# Patient Record
Sex: FEMALE | Race: WHITE
[De-identification: names, ages, dates, MRNs, and addresses within clinical notes are randomized per-mention and may not be internally consistent; named-entity substitution may affect disease eponyms.]

---

## 2018-02-16 ENCOUNTER — HOSPITAL ENCOUNTER (OUTPATIENT)
Dept: HOSPITAL 68 - STS | Age: 48
End: 2018-02-16
Attending: UROLOGY
Payer: COMMERCIAL

## 2018-02-16 VITALS — WEIGHT: 182 LBS | HEIGHT: 64 IN | BODY MASS INDEX: 31.07 KG/M2

## 2018-02-16 DIAGNOSIS — N39.41: ICD-10-CM

## 2018-02-16 DIAGNOSIS — N81.10: ICD-10-CM

## 2018-02-16 DIAGNOSIS — N32.81: ICD-10-CM

## 2018-02-16 DIAGNOSIS — R10.2: ICD-10-CM

## 2018-02-16 DIAGNOSIS — Z85.43: ICD-10-CM

## 2018-02-16 DIAGNOSIS — N39.3: Primary | ICD-10-CM

## 2018-02-16 DIAGNOSIS — Z87.442: ICD-10-CM

## 2018-02-16 PROCEDURE — C9399 UNCLASSIFIED DRUGS OR BIOLOG: HCPCS

## 2018-02-16 PROCEDURE — C1771 REP DEV, URINARY, W/SLING: HCPCS

## 2018-02-16 NOTE — OPERATIVE REPORT
Operative/Inv Procedure Report
Surgery Date: 02/16/18
Name of Procedure:
cystocele repair with mesh, urethral sling, cystoscopy
Pre-Operative Diagnosis:
cystocele grade 3 and stress incontinence
Post-Operative Diagnosis:
same
Estimated Blood Loss: scant (150cc)
Surgeon/Assistant:
Lyssa Rubio MD
 
Anesthesia: laryngeal mask airway
Implants:
vaginal mesh
Complications:
none
Condition:
stable
Operative Indication:
cystocele grade 3 and stress incontinence
 
Operative/Procedure Note
Note:
Operative dictation on patient Sydney davila.  She has a history of symptomatic 
vaginal bulge and stress urinary incontinence and she wished to proceed with 
cystocele repair with mesh and urethral sling.  The risks of vaginal mesh was 
discussed as well as the risks benefits and alternatives of the surgery in 
general.  All questions were answered.  This was done in the office setting as 
well as the holding area.
 
Patient was taken to the operating room placed on the operating table in the 
supine position.  Timeout was performed.  IV antibiotics were infused.  Patient 
was placed in the dorsolithotomy position and she was prepped and draped in the 
standard sterile fashion after shaving the genitalia region.  Nelson catheter was
placed in the bladder was drained.  The Nelosn was clamped and placed on the 
patient's abdomen.  [place] retractor was placed to allow for excellent 
visualization.  The cystocele was addressed first.  1% lidocaine was infiltrated
into the anterior vaginal wall from the bladder neck to the cervix.  The vaginal
laps were created after this incised.  Care was taken not to injure the bladder.
 Once the bladder was completely dissected free and the dissection was carried 
down to the retropubic space to the sacrospinous process on both sides.  The 
ligament was cleaned off and the Capio thin device was used to place the Prolene
sutures into the ligaments on the right and left side.  The bladder neck sutures
were then placed on the right and left side.  The Restoril anterior mesh was 
then placed using these 4 sutures.  Methylene blue 10 mL were administered at 
this time by anesthesia to check for the patency of the ureters later in this 
case.  The mesh was secured at the bladder neck and at the proximal portion near
the cervix with 2-0 vicryl suture.  The mesh was seen to nicely reduce the 
cystocele.  There area was copiously irrigated with bacitracin irrigation.  
Surgicel was placed into the retropubic space on the right and left side.  
Cystoscopy was then performed and the bladder was globally inspected.  There was
no mesh in the bladder or the urethra.  The ureteral orifices were easily 
identified.  Blue efflux was seen emanating with excellent flow on both sides.  
Nelson was placed back into the bladder.  The incision site was closed with 2-0 
Vicryl running suture with interrupted interlocking sutures every third.  
 
Attention was then turned to the sling portion of the case.  1% lidocaine was 
infiltrated beneath the urethra.  Vaginal flaps are created taking care not to 
injure the urethra.  The Altis Sling kit was then opened and used to place the 
sling with the trochars provided.  The sling was seen to be in a nice tension-
free manner in a horizontal lie beneath the urethra.  There was no mesh in the 
vaginal fornices.  The tightening Prolene stitch was cut.  Incision was closed 
with running locking 3-0 Vicryl suture.  Cystoscopy again was performed and 
there was no mesh in the bladder wall or the urethra.  Sponge and needle count 
were correct at the end of the case.  Patient tolerated procedure well.  She was
transferred to the recovery room stable condition.
Findings:
no mesh in bladder,urethra or vag fornices
good bilateral ureteral efflux
Discharge Disposition: PACU